# Patient Record
Sex: FEMALE | Race: WHITE | NOT HISPANIC OR LATINO | Employment: FULL TIME | URBAN - METROPOLITAN AREA
[De-identification: names, ages, dates, MRNs, and addresses within clinical notes are randomized per-mention and may not be internally consistent; named-entity substitution may affect disease eponyms.]

---

## 2023-03-04 PROBLEM — E55.9 VITAMIN D DEFICIENCY: Status: ACTIVE | Noted: 2023-03-04

## 2023-03-04 PROBLEM — R05.9 COUGH: Status: ACTIVE | Noted: 2023-03-04

## 2023-03-04 PROBLEM — F41.9 ANXIETY AND DEPRESSION: Status: ACTIVE | Noted: 2023-03-04

## 2023-03-04 PROBLEM — N94.6 MENSTRUAL CRAMP: Status: ACTIVE | Noted: 2023-03-04

## 2023-03-04 PROBLEM — J31.0 RHINITIS: Status: ACTIVE | Noted: 2023-03-04

## 2023-03-04 PROBLEM — S63.619A FINGER SPRAIN: Status: ACTIVE | Noted: 2023-03-04

## 2023-03-04 PROBLEM — J02.9 PHARYNGITIS: Status: ACTIVE | Noted: 2023-03-04

## 2023-03-04 PROBLEM — J30.9 ALLERGIC RHINITIS: Status: ACTIVE | Noted: 2023-03-04
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
## 2023-03-04 PROBLEM — D50.9 IRON DEFICIENCY ANEMIA: Status: ACTIVE | Noted: 2023-03-04

## 2023-03-04 PROBLEM — B00.1 RECURRENT COLD SORES: Status: ACTIVE | Noted: 2023-03-04

## 2023-03-04 PROBLEM — R22.42 LOCALIZED SWELLING OF LEFT LOWER LEG: Status: ACTIVE | Noted: 2023-03-04

## 2023-03-04 PROBLEM — M79.646 FINGER PAIN: Status: ACTIVE | Noted: 2023-03-04

## 2023-03-04 PROBLEM — R53.83 FATIGUE: Status: ACTIVE | Noted: 2023-03-04

## 2023-03-04 PROBLEM — E66.9 OBESITY: Status: ACTIVE | Noted: 2023-03-04

## 2023-03-04 PROBLEM — F32.A ANXIETY AND DEPRESSION: Status: ACTIVE | Noted: 2023-03-04

## 2023-03-04 PROBLEM — S71.159A DOG BITE OF THIGH: Status: ACTIVE | Noted: 2023-03-04

## 2023-03-04 PROBLEM — J02.9 SORE THROAT: Status: ACTIVE | Noted: 2023-03-04

## 2023-03-04 RX ORDER — SERTRALINE HYDROCHLORIDE 50 MG/1
1 TABLET, FILM COATED ORAL DAILY
COMMUNITY
Start: 2019-09-04 | End: 2023-03-09 | Stop reason: SDUPTHER

## 2023-03-04 RX ORDER — ERGOCALCIFEROL 1.25 MG/1
1.25 CAPSULE ORAL 2 TIMES WEEKLY
COMMUNITY
Start: 2021-12-10 | End: 2023-03-09 | Stop reason: ALTCHOICE

## 2023-03-04 RX ORDER — PHENTERMINE HYDROCHLORIDE 37.5 MG/1
1 CAPSULE ORAL
COMMUNITY
Start: 2020-05-14 | End: 2023-03-09 | Stop reason: ALTCHOICE

## 2023-03-09 ENCOUNTER — OFFICE VISIT (OUTPATIENT)
Dept: PRIMARY CARE | Facility: CLINIC | Age: 38
End: 2023-03-09
Payer: COMMERCIAL

## 2023-03-09 VITALS
DIASTOLIC BLOOD PRESSURE: 78 MMHG | TEMPERATURE: 98.2 F | SYSTOLIC BLOOD PRESSURE: 126 MMHG | HEART RATE: 84 BPM | RESPIRATION RATE: 16 BRPM

## 2023-03-09 DIAGNOSIS — M79.605 LEFT LEG PAIN: ICD-10-CM

## 2023-03-09 DIAGNOSIS — R22.42 LOCALIZED SWELLING OF LEFT LOWER LEG: Primary | ICD-10-CM

## 2023-03-09 DIAGNOSIS — R93.6 ABNORMAL X-RAY OF EXTREMITY: ICD-10-CM

## 2023-03-09 DIAGNOSIS — Z13.220 LIPID SCREENING: ICD-10-CM

## 2023-03-09 DIAGNOSIS — R53.83 OTHER FATIGUE: ICD-10-CM

## 2023-03-09 DIAGNOSIS — F41.9 ANXIETY AND DEPRESSION: ICD-10-CM

## 2023-03-09 DIAGNOSIS — F32.A ANXIETY AND DEPRESSION: ICD-10-CM

## 2023-03-09 PROCEDURE — 1036F TOBACCO NON-USER: CPT | Performed by: NURSE PRACTITIONER

## 2023-03-09 PROCEDURE — 99214 OFFICE O/P EST MOD 30 MIN: CPT | Performed by: NURSE PRACTITIONER

## 2023-03-09 RX ORDER — ONDANSETRON 4 MG/1
TABLET, ORALLY DISINTEGRATING ORAL
COMMUNITY
Start: 2022-03-10 | End: 2024-03-19 | Stop reason: WASHOUT

## 2023-03-09 RX ORDER — SERTRALINE HYDROCHLORIDE 50 MG/1
50 TABLET, FILM COATED ORAL DAILY
Qty: 30 TABLET | Refills: 0 | Status: SHIPPED | OUTPATIENT
Start: 2023-03-09 | End: 2023-04-21 | Stop reason: ALTCHOICE

## 2023-03-09 ASSESSMENT — ENCOUNTER SYMPTOMS: LEG PAIN: 1

## 2023-03-09 NOTE — PROGRESS NOTES
Subjective   Patient ID: Amber Carranza is a 37 y.o. female who presents for Leg Pain and Premenstrual Syndrome.    Pt here for leg pain and pmdd    Leg Pain   The incident occurred more than 1 week ago. There was no injury mechanism. The pain is present in the left leg. The quality of the pain is described as aching. The pain is mild. The pain has been Constant since onset. The symptoms are aggravated by movement and palpation. She has tried nothing for the symptoms. The treatment provided mild relief.        Review of Systems   Constitutional:  Negative for chills, fatigue and fever.   HENT:  Negative for congestion, ear pain, rhinorrhea, sinus pressure and sore throat.    Eyes:  Negative for pain, discharge and itching.   Respiratory:  Negative for cough, shortness of breath and wheezing.    Cardiovascular:  Negative for chest pain and palpitations.   Gastrointestinal:  Negative for constipation, diarrhea, nausea and vomiting.   Genitourinary:  Negative for difficulty urinating and dysuria.   Musculoskeletal:  Positive for arthralgias and joint swelling. Negative for back pain and myalgias.   Skin:  Negative for color change.   Neurological:  Negative for headaches.   Hematological:  Negative for adenopathy.   Psychiatric/Behavioral:  Negative for decreased concentration. The patient is nervous/anxious.        Objective   /78   Pulse 84   Temp 36.8 °C (98.2 °F)   Resp 16     Physical Exam  Constitutional:       General: She is not in acute distress.     Appearance: She is not ill-appearing.   HENT:      Head: Normocephalic and atraumatic.      Mouth/Throat:      Mouth: Mucous membranes are moist.      Pharynx: Oropharynx is clear.   Eyes:      Conjunctiva/sclera: Conjunctivae normal.      Pupils: Pupils are equal, round, and reactive to light.   Cardiovascular:      Rate and Rhythm: Normal rate and regular rhythm.      Pulses: Normal pulses.      Heart sounds: Normal heart sounds.   Pulmonary:       Effort: Pulmonary effort is normal. No respiratory distress.      Breath sounds: Normal breath sounds.   Abdominal:      General: Bowel sounds are normal.      Palpations: Abdomen is soft.      Tenderness: There is no abdominal tenderness.   Musculoskeletal:         General: Swelling present. No tenderness. Normal range of motion.      Cervical back: Normal range of motion and neck supple.      Right lower leg: Edema present.      Left lower leg: Edema present.   Skin:     General: Skin is warm and dry.   Neurological:      General: No focal deficit present.      Mental Status: She is alert and oriented to person, place, and time.   Psychiatric:         Mood and Affect: Mood normal.         Behavior: Behavior normal.         Thought Content: Thought content normal.         Judgment: Judgment normal.         Assessment/Plan        Patient to have labs and xray done, and we will call with results when available. Continue meds as ordered, and referred to ortho. Follow-up in 3 months for physical, or sooner if needed. Call the office if any problems or concerns in the meantime.    More than 50% of the visit was spent counseling the patient. A total of more than 30 minutes was spent.

## 2023-03-11 ENCOUNTER — LAB (OUTPATIENT)
Dept: LAB | Facility: LAB | Age: 38
End: 2023-03-11
Payer: COMMERCIAL

## 2023-03-11 DIAGNOSIS — F32.A ANXIETY AND DEPRESSION: ICD-10-CM

## 2023-03-11 DIAGNOSIS — F41.9 ANXIETY AND DEPRESSION: ICD-10-CM

## 2023-03-11 DIAGNOSIS — R53.83 OTHER FATIGUE: ICD-10-CM

## 2023-03-11 DIAGNOSIS — Z13.220 LIPID SCREENING: ICD-10-CM

## 2023-03-11 LAB
ALANINE AMINOTRANSFERASE (SGPT) (U/L) IN SER/PLAS: 24 U/L (ref 7–45)
ALBUMIN (G/DL) IN SER/PLAS: 4.3 G/DL (ref 3.4–5)
ALKALINE PHOSPHATASE (U/L) IN SER/PLAS: 75 U/L (ref 33–110)
ANION GAP IN SER/PLAS: 11 MMOL/L (ref 10–20)
ASPARTATE AMINOTRANSFERASE (SGOT) (U/L) IN SER/PLAS: 22 U/L (ref 9–39)
BASOPHILS (10*3/UL) IN BLOOD BY AUTOMATED COUNT: 0.02 X10E9/L (ref 0–0.1)
BASOPHILS/100 LEUKOCYTES IN BLOOD BY AUTOMATED COUNT: 0.2 % (ref 0–2)
BILIRUBIN TOTAL (MG/DL) IN SER/PLAS: 0.5 MG/DL (ref 0–1.2)
CALCIDIOL (25 OH VITAMIN D3) (NG/ML) IN SER/PLAS: 17 NG/ML
CALCIUM (MG/DL) IN SER/PLAS: 9.1 MG/DL (ref 8.6–10.3)
CARBON DIOXIDE, TOTAL (MMOL/L) IN SER/PLAS: 27 MMOL/L (ref 21–32)
CHLORIDE (MMOL/L) IN SER/PLAS: 102 MMOL/L (ref 98–107)
CHOLESTEROL (MG/DL) IN SER/PLAS: 194 MG/DL (ref 0–199)
CHOLESTEROL IN HDL (MG/DL) IN SER/PLAS: 37 MG/DL
CHOLESTEROL/HDL RATIO: 5.2
CREATININE (MG/DL) IN SER/PLAS: 0.68 MG/DL (ref 0.5–1.05)
EOSINOPHILS (10*3/UL) IN BLOOD BY AUTOMATED COUNT: 0.14 X10E9/L (ref 0–0.7)
EOSINOPHILS/100 LEUKOCYTES IN BLOOD BY AUTOMATED COUNT: 1.7 % (ref 0–6)
ERYTHROCYTE DISTRIBUTION WIDTH (RATIO) BY AUTOMATED COUNT: 13.5 % (ref 11.5–14.5)
ERYTHROCYTE MEAN CORPUSCULAR HEMOGLOBIN CONCENTRATION (G/DL) BY AUTOMATED: 31.5 G/DL (ref 32–36)
ERYTHROCYTE MEAN CORPUSCULAR VOLUME (FL) BY AUTOMATED COUNT: 85 FL (ref 80–100)
ERYTHROCYTES (10*6/UL) IN BLOOD BY AUTOMATED COUNT: 4.65 X10E12/L (ref 4–5.2)
ESTIMATED AVERAGE GLUCOSE FOR HBA1C: 103 MG/DL
GFR FEMALE: >90 ML/MIN/1.73M2
GLUCOSE (MG/DL) IN SER/PLAS: 93 MG/DL (ref 74–99)
HEMATOCRIT (%) IN BLOOD BY AUTOMATED COUNT: 39.4 % (ref 36–46)
HEMOGLOBIN (G/DL) IN BLOOD: 12.4 G/DL (ref 12–16)
HEMOGLOBIN A1C/HEMOGLOBIN TOTAL IN BLOOD: 5.2 %
IMMATURE GRANULOCYTES/100 LEUKOCYTES IN BLOOD BY AUTOMATED COUNT: 0.4 % (ref 0–0.9)
LDL: 122 MG/DL (ref 0–99)
LEUKOCYTES (10*3/UL) IN BLOOD BY AUTOMATED COUNT: 8.1 X10E9/L (ref 4.4–11.3)
LYMPHOCYTES (10*3/UL) IN BLOOD BY AUTOMATED COUNT: 1.87 X10E9/L (ref 1.2–4.8)
LYMPHOCYTES/100 LEUKOCYTES IN BLOOD BY AUTOMATED COUNT: 23.1 % (ref 13–44)
MONOCYTES (10*3/UL) IN BLOOD BY AUTOMATED COUNT: 0.58 X10E9/L (ref 0.1–1)
MONOCYTES/100 LEUKOCYTES IN BLOOD BY AUTOMATED COUNT: 7.2 % (ref 2–10)
NEUTROPHILS (10*3/UL) IN BLOOD BY AUTOMATED COUNT: 5.45 X10E9/L (ref 1.2–7.7)
NEUTROPHILS/100 LEUKOCYTES IN BLOOD BY AUTOMATED COUNT: 67.4 % (ref 40–80)
PLATELETS (10*3/UL) IN BLOOD AUTOMATED COUNT: 266 X10E9/L (ref 150–450)
POTASSIUM (MMOL/L) IN SER/PLAS: 4.3 MMOL/L (ref 3.5–5.3)
PROTEIN TOTAL: 7 G/DL (ref 6.4–8.2)
SODIUM (MMOL/L) IN SER/PLAS: 136 MMOL/L (ref 136–145)
THYROTROPIN (MIU/L) IN SER/PLAS BY DETECTION LIMIT <= 0.05 MIU/L: 1.74 MIU/L (ref 0.44–3.98)
TRIGLYCERIDE (MG/DL) IN SER/PLAS: 174 MG/DL (ref 0–149)
UREA NITROGEN (MG/DL) IN SER/PLAS: 13 MG/DL (ref 6–23)
VLDL: 35 MG/DL (ref 0–40)

## 2023-03-11 PROCEDURE — 84443 ASSAY THYROID STIM HORMONE: CPT

## 2023-03-11 PROCEDURE — 83036 HEMOGLOBIN GLYCOSYLATED A1C: CPT

## 2023-03-11 PROCEDURE — 85025 COMPLETE CBC W/AUTO DIFF WBC: CPT

## 2023-03-11 PROCEDURE — 80061 LIPID PANEL: CPT

## 2023-03-11 PROCEDURE — 82306 VITAMIN D 25 HYDROXY: CPT

## 2023-03-11 PROCEDURE — 80053 COMPREHEN METABOLIC PANEL: CPT

## 2023-03-11 PROCEDURE — 36415 COLL VENOUS BLD VENIPUNCTURE: CPT

## 2023-03-13 DIAGNOSIS — E55.9 VITAMIN D DEFICIENCY: ICD-10-CM

## 2023-03-13 DIAGNOSIS — K52.9 ACUTE GASTROENTERITIS: Primary | ICD-10-CM

## 2023-03-13 RX ORDER — ERGOCALCIFEROL 1.25 MG/1
50000 CAPSULE ORAL
Qty: 12 CAPSULE | Refills: 0 | Status: SHIPPED | OUTPATIENT
Start: 2023-03-13 | End: 2024-03-19 | Stop reason: WASHOUT

## 2023-03-16 ENCOUNTER — DOCUMENTATION (OUTPATIENT)
Dept: PRIMARY CARE | Facility: CLINIC | Age: 38
End: 2023-03-16
Payer: COMMERCIAL

## 2023-03-16 ENCOUNTER — TELEPHONE (OUTPATIENT)
Dept: PRIMARY CARE | Facility: CLINIC | Age: 38
End: 2023-03-16
Payer: COMMERCIAL

## 2023-03-16 ASSESSMENT — ENCOUNTER SYMPTOMS
RHINORRHEA: 0
WHEEZING: 0
SINUS PRESSURE: 0
PALPITATIONS: 0
NERVOUS/ANXIOUS: 1
DIFFICULTY URINATING: 0
EYE PAIN: 0
COLOR CHANGE: 0
DECREASED CONCENTRATION: 0
DYSURIA: 0
FATIGUE: 0
DIARRHEA: 0
COUGH: 0
EYE ITCHING: 0
ARTHRALGIAS: 1
ADENOPATHY: 0
NAUSEA: 0
HEADACHES: 0
CHILLS: 0
VOMITING: 0
BACK PAIN: 0
MYALGIAS: 0
EYE DISCHARGE: 0
SHORTNESS OF BREATH: 0
CONSTIPATION: 0
FEVER: 0
SORE THROAT: 0
JOINT SWELLING: 1

## 2023-04-20 ENCOUNTER — TELEPHONE (OUTPATIENT)
Dept: PRIMARY CARE | Facility: CLINIC | Age: 38
End: 2023-04-20
Payer: COMMERCIAL

## 2023-04-21 DIAGNOSIS — F32.A ANXIETY AND DEPRESSION: Primary | ICD-10-CM

## 2023-04-21 DIAGNOSIS — F41.9 ANXIETY AND DEPRESSION: Primary | ICD-10-CM

## 2023-04-21 RX ORDER — VILAZODONE HYDROCHLORIDE 10 MG/1
TABLET ORAL
Qty: 60 TABLET | Refills: 0 | Status: SHIPPED | OUTPATIENT
Start: 2023-04-21 | End: 2024-03-19 | Stop reason: WASHOUT

## 2023-11-02 DIAGNOSIS — E55.9 VITAMIN D DEFICIENCY: Primary | ICD-10-CM

## 2023-11-02 DIAGNOSIS — D50.9 IRON DEFICIENCY ANEMIA, UNSPECIFIED IRON DEFICIENCY ANEMIA TYPE: ICD-10-CM

## 2023-11-02 DIAGNOSIS — E78.2 MIXED HYPERLIPIDEMIA: ICD-10-CM

## 2024-03-19 ENCOUNTER — OFFICE VISIT (OUTPATIENT)
Dept: PRIMARY CARE | Facility: CLINIC | Age: 39
End: 2024-03-19
Payer: COMMERCIAL

## 2024-03-19 VITALS
OXYGEN SATURATION: 95 % | SYSTOLIC BLOOD PRESSURE: 122 MMHG | WEIGHT: 293 LBS | DIASTOLIC BLOOD PRESSURE: 84 MMHG | BODY MASS INDEX: 52.75 KG/M2 | RESPIRATION RATE: 18 BRPM | HEART RATE: 90 BPM | TEMPERATURE: 98.4 F

## 2024-03-19 DIAGNOSIS — J02.9 SORE THROAT: ICD-10-CM

## 2024-03-19 DIAGNOSIS — J02.9 PHARYNGITIS, UNSPECIFIED ETIOLOGY: Primary | ICD-10-CM

## 2024-03-19 LAB — POC RAPID STREP: NEGATIVE

## 2024-03-19 PROCEDURE — 87880 STREP A ASSAY W/OPTIC: CPT | Performed by: NURSE PRACTITIONER

## 2024-03-19 PROCEDURE — 1036F TOBACCO NON-USER: CPT | Performed by: NURSE PRACTITIONER

## 2024-03-19 PROCEDURE — 99213 OFFICE O/P EST LOW 20 MIN: CPT | Performed by: NURSE PRACTITIONER

## 2024-03-19 PROCEDURE — 87651 STREP A DNA AMP PROBE: CPT

## 2024-03-19 RX ORDER — AMOXICILLIN AND CLAVULANATE POTASSIUM 875; 125 MG/1; MG/1
875 TABLET, FILM COATED ORAL 2 TIMES DAILY
Qty: 20 TABLET | Refills: 0 | Status: SHIPPED | OUTPATIENT
Start: 2024-03-19 | End: 2024-03-29

## 2024-03-19 ASSESSMENT — ENCOUNTER SYMPTOMS
DIARRHEA: 1
SHORTNESS OF BREATH: 0
HEADACHES: 1
SORE THROAT: 1
ABDOMINAL PAIN: 0
CHILLS: 0
FATIGUE: 0
NAUSEA: 0
WHEEZING: 0
RHINORRHEA: 1
COUGH: 1
APPETITE CHANGE: 0
VOMITING: 0
SINUS PRESSURE: 0
SINUS PAIN: 0
MYALGIAS: 0

## 2024-03-19 NOTE — PROGRESS NOTES
Subjective   Patient ID: Amber Carranza is a 38 y.o. female who presents for Sore Throat.    Patient's symptoms started on Thursday with a sore throat. It has worsened over the past two nights. Patient has a runny nose, stuffy nose. Patient is vaccinated against COVID with a booster (not the most recent vaccine). Patient has not tried anything for her symptoms.     Review of Systems   Constitutional:  Negative for appetite change, chills and fatigue.   HENT:  Positive for congestion, rhinorrhea and sore throat. Negative for sinus pressure and sinus pain.    Respiratory:  Positive for cough. Negative for shortness of breath and wheezing.    Cardiovascular:  Negative for chest pain.   Gastrointestinal:  Positive for diarrhea. Negative for abdominal pain, nausea and vomiting.   Musculoskeletal:  Negative for myalgias.   Neurological:  Positive for headaches.     Objective   /84   Pulse 90   Temp 36.9 °C (98.4 °F) (Temporal)   Resp 18   Wt 144 kg (317 lb)   SpO2 95%   BMI 52.75 kg/m²     Physical Exam  Vitals reviewed.   Constitutional:       General: She is not in acute distress.     Appearance: Normal appearance. She is not toxic-appearing.   HENT:      Head: Atraumatic.      Right Ear: Tympanic membrane, ear canal and external ear normal.      Left Ear: Tympanic membrane, ear canal and external ear normal.      Nose: Congestion present. No rhinorrhea.      Mouth/Throat:      Pharynx: Posterior oropharyngeal erythema present. No oropharyngeal exudate.      Comments: Tonsils enlarged +2, uvula midline, moderate post nasal drainage  Eyes:      Conjunctiva/sclera: Conjunctivae normal.   Cardiovascular:      Rate and Rhythm: Normal rate and regular rhythm.      Heart sounds: Normal heart sounds. No murmur heard.  Pulmonary:      Effort: Pulmonary effort is normal.      Breath sounds: Normal breath sounds. No wheezing or rhonchi.   Skin:     General: Skin is warm and dry.   Neurological:      General: No  focal deficit present.      Mental Status: She is alert.   Psychiatric:         Mood and Affect: Mood normal.     Assessment/Plan   Problem List Items Addressed This Visit             ICD-10-CM    Pharyngitis - Primary J02.9    Relevant Medications    amoxicillin-pot clavulanate (Augmentin) 875-125 mg tablet    Sore throat J02.9    Relevant Orders    POCT rapid strep A manually resulted (Completed)    Group A Streptococcus, PCR     Rapid strep is negative. Will get back up PCR strep testing. Patient will do a COVID test to rule out COVID. Pt started on augmentin at this time. She will use flonase as well. Advised patient on use of humidifier and hot steam treatments. Discussed that patient is to drink plenty of fluids and stay well hydrated. Can take tylenol as needed for any fevers or discomfort. Discussed that patient is to go to the ER for any chest pain, difficulty breathing, shortness of breath or new/concerning symptoms; she agreed. Will call pt when results become available. Advised follow up in 2-3 days if symptoms persist despite the use of the medications.

## 2024-03-20 ENCOUNTER — TELEPHONE (OUTPATIENT)
Dept: PRIMARY CARE | Facility: CLINIC | Age: 39
End: 2024-03-20
Payer: COMMERCIAL

## 2024-03-20 LAB — S PYO DNA THROAT QL NAA+PROBE: NOT DETECTED

## 2024-03-20 NOTE — TELEPHONE ENCOUNTER
----- Message from JJ Bello-CNP sent at 3/20/2024 10:25 AM EDT -----  Strep PCR testing is negative.

## 2024-07-22 ENCOUNTER — HOSPITAL ENCOUNTER (OUTPATIENT)
Dept: RADIOLOGY | Facility: CLINIC | Age: 39
Discharge: HOME | End: 2024-07-22
Payer: COMMERCIAL

## 2024-07-22 ENCOUNTER — OFFICE VISIT (OUTPATIENT)
Dept: ORTHOPEDIC SURGERY | Facility: CLINIC | Age: 39
End: 2024-07-22
Payer: COMMERCIAL

## 2024-07-22 DIAGNOSIS — M25.562 ACUTE PAIN OF LEFT KNEE: ICD-10-CM

## 2024-07-22 DIAGNOSIS — M23.92 INTERNAL DERANGEMENT OF LEFT KNEE: ICD-10-CM

## 2024-07-22 DIAGNOSIS — S83.92XA SPRAIN OF LEFT KNEE, INITIAL ENCOUNTER: Primary | ICD-10-CM

## 2024-07-22 PROCEDURE — 99213 OFFICE O/P EST LOW 20 MIN: CPT | Performed by: INTERNAL MEDICINE

## 2024-07-22 PROCEDURE — 99203 OFFICE O/P NEW LOW 30 MIN: CPT | Performed by: INTERNAL MEDICINE

## 2024-07-22 PROCEDURE — 73564 X-RAY EXAM KNEE 4 OR MORE: CPT | Mod: LT

## 2024-07-22 PROCEDURE — 73564 X-RAY EXAM KNEE 4 OR MORE: CPT | Mod: LEFT SIDE | Performed by: INTERNAL MEDICINE

## 2024-07-22 RX ORDER — METHYLPREDNISOLONE 4 MG/1
TABLET ORAL
Qty: 1 EACH | Refills: 0 | Status: SHIPPED | OUTPATIENT
Start: 2024-07-22

## 2024-07-22 NOTE — PROGRESS NOTES
Acute Injury New Patient Visit    CC:   Chief Complaint   Patient presents with    Left Knee - Pain     Patient states hit knee on side of garage a month ago. Having pain still. Xrays today.        HPI: Amber is a 38 y.o. female presents today for evaluation for subacute left knee injury sustained about a month ago she hit her left knee against the side side of the garage. She notes worsening left knee pain in the past five days. She is here for initial evaluation and x-rays.         Review of Systems   GENERAL: Negative for malaise, significant weight loss, fever  MUSCULOSKELETAL: See HPI  NEURO:  Negative for numbness / tingling     Past Medical History  Past Medical History:   Diagnosis Date    Other specified health status 04/17/2019    No pertinent past medical history       Medication review  Medication Documentation Review Audit       Reviewed by PIOTR Bello (Nurse Practitioner) on 03/19/24 at 1623      Medication Order Taking? Sig Documenting Provider Last Dose Status   Patient not taking:  Discontinued 03/19/24 1622   Discontinued 03/19/24 1622   Patient not taking:  Discontinued 03/19/24 1622                    Allergies  No Known Allergies    Social History  Social History     Socioeconomic History    Marital status:      Spouse name: Not on file    Number of children: Not on file    Years of education: Not on file    Highest education level: Not on file   Occupational History    Not on file   Tobacco Use    Smoking status: Never    Smokeless tobacco: Never   Vaping Use    Vaping status: Never Used   Substance and Sexual Activity    Alcohol use: Never    Drug use: Never    Sexual activity: Not on file   Other Topics Concern    Not on file   Social History Narrative    Not on file     Social Determinants of Health     Financial Resource Strain: Not on file   Food Insecurity: Not on file   Transportation Needs: Not on file   Physical Activity: Not on file   Stress: Not on file    Social Connections: Not on file   Intimate Partner Violence: Not on file   Housing Stability: Not on file       Surgical History  Past Surgical History:   Procedure Laterality Date    MR HEAD ANGIO WO IV CONTRAST  3/10/2022    MR HEAD ANGIO WO IV CONTRAST 3/10/2022 [facility]    MR NECK ANGIO WO IV CONTRAST  3/10/2022    MR NECK ANGIO WO IV CONTRAST 3/10/2022 [facility]    OTHER SURGICAL HISTORY  2019     section       Physical Exam:  GENERAL:  Patient is awake, alert, and oriented to person place and time.  Patient appears well nourished and well kept.  Affect Calm, Not Acutely Distressed.  HEENT:  Normocephalic, Atraumatic, EOMI  CARDIOVASCULAR:  Hemodynamically stable.  RESPIRATORY:  Normal respirations with unlabored breathing.  Extremity: Left knee examination shows skin is intact.  There is no erythema or warmth.  Trace amount of effusion.  Can flex the right knee to 120 degrees with pain.  Full extension at 0 degrees.  Pain over the medial joint line.  Pain over the lateral joint line.  Pain over the lateral femoral condyle.  There is no pain over the patellar or quadricep tendon.  No pain over the proximal tibia.  No pain over the popliteal fossa.  Negative valgus stress test.  Negative varus stress test.  Positive Mauricio's test medially with no instability.  Positive Mauricio's test laterally with no instability.  Negative Lachman's test.  Patellar and quadricep mechanism intact.  Negative anterior and posterior drawer test.  Negative patellar apprehension test.  Distal pulses are palpable, neurovascularly intact.  Walking with no significant antalgic gait.      Diagnostics: X-rays reviewed      Procedure: None    Assessment:   Left knee sprain and contusion  Left knee internal derangement    Plan: Amber presents today for initial evaluation for subacute left knee injury sustained about a month ago. X-rays showed no obvious fractures. We recommended MRI of the left  knee and Medrol dose Hubert for acute inflammation. She will follow-up after the MRI.     Orders Placed This Encounter    XR knee left 4+ views      At the conclusion of the visit there were no further questions by the patient/family regarding their plan of care.  Patient was instructed to call or return with any issues, questions, or concerns regarding their injury and/or treatment plan described above.     07/22/24 at 4:17 PM - Miriam Li MD  Scribe Attestation  By signing my name below, I, Shane Debra, Scribe   attest that this documentation has been prepared under the direction and in the presence of Miriam Li MD.    Office: [phone]    This note was prepared using voice recognition software.  The details of this note are correct and have been reviewed, and corrected to the best of my ability.  Some grammatical errors may persist related to the Dragon software.

## 2024-10-10 ENCOUNTER — OFFICE VISIT (OUTPATIENT)
Dept: PRIMARY CARE | Facility: CLINIC | Age: 39
End: 2024-10-10
Payer: COMMERCIAL

## 2024-10-10 VITALS
WEIGHT: 293 LBS | TEMPERATURE: 98.2 F | BODY MASS INDEX: 47.09 KG/M2 | DIASTOLIC BLOOD PRESSURE: 80 MMHG | HEART RATE: 101 BPM | RESPIRATION RATE: 15 BRPM | HEIGHT: 66 IN | SYSTOLIC BLOOD PRESSURE: 126 MMHG

## 2024-10-10 DIAGNOSIS — E66.813 CLASS 3 SEVERE OBESITY DUE TO EXCESS CALORIES WITH BODY MASS INDEX (BMI) OF 50.0 TO 59.9 IN ADULT, UNSPECIFIED WHETHER SERIOUS COMORBIDITY PRESENT: Primary | ICD-10-CM

## 2024-10-10 DIAGNOSIS — R63.8 UNABLE TO LOSE WEIGHT: ICD-10-CM

## 2024-10-10 DIAGNOSIS — F41.9 ANXIETY AND DEPRESSION: ICD-10-CM

## 2024-10-10 DIAGNOSIS — F32.A ANXIETY AND DEPRESSION: ICD-10-CM

## 2024-10-10 DIAGNOSIS — E55.9 VITAMIN D DEFICIENCY: ICD-10-CM

## 2024-10-10 DIAGNOSIS — E66.01 CLASS 3 SEVERE OBESITY DUE TO EXCESS CALORIES WITH BODY MASS INDEX (BMI) OF 50.0 TO 59.9 IN ADULT, UNSPECIFIED WHETHER SERIOUS COMORBIDITY PRESENT: Primary | ICD-10-CM

## 2024-10-10 DIAGNOSIS — Z13.220 LIPID SCREENING: ICD-10-CM

## 2024-10-10 PROCEDURE — 3008F BODY MASS INDEX DOCD: CPT | Performed by: NURSE PRACTITIONER

## 2024-10-10 PROCEDURE — 99214 OFFICE O/P EST MOD 30 MIN: CPT | Performed by: NURSE PRACTITIONER

## 2024-10-10 RX ORDER — INFLUENZA A VIRUS A/GEORGIA/12/2022 CVR-167 (H1N1) ANTIGEN (MDCK CELL DERIVED, PROPIOLACTONE INACTIVATED), INFLUENZA A VIRUS A/DARWIN/11/2021 (H3N2) ANTIGEN (MDCK CELL DERIVED, PROPIOLACTONE INACTIVATED),INFLUENZA B VIRUS B/SINGAPORE/WUH4618/2021 ANTIGEN (MDCK CELL DERIVED, PROPIOLACTONE INACTIVATED),INFLUENZA B VIRUS B/SINGAPORE/INFTT-16-0610/2016 ANTIGEN (MDCK CELL DERIVED, PROPIOLACTONE INACTIVATED) 15; 15; 15; 15 UG/.5ML; UG/.5ML; UG/.5ML; UG/.5ML
0.5 INJECTION, SUSPENSION INTRAMUSCULAR ONCE
COMMUNITY
Start: 2023-10-24

## 2024-10-10 RX ORDER — VILAZODONE HYDROCHLORIDE 10 MG/1
10 TABLET ORAL DAILY
Qty: 30 TABLET | Refills: 1 | Status: SHIPPED | OUTPATIENT
Start: 2024-10-10 | End: 2024-12-09

## 2024-10-10 RX ORDER — PHENTERMINE HYDROCHLORIDE 37.5 MG/1
37.5 TABLET ORAL
Qty: 30 TABLET | Refills: 0 | Status: SHIPPED | OUTPATIENT
Start: 2024-10-10 | End: 2024-11-09

## 2024-10-10 NOTE — PROGRESS NOTES
"Subjective   Patient ID: Amber Carranza is a 38 y.o. female who presents for No chief complaint on file..    Depression, feels like she's going to kill her . Would like to lose weight.    Subjective  Amber Carranza is a 38 y.o. female here for discussion regarding weight loss. She has noted a weight gain of approximately 50 pounds over the last 5 years. She feels ideal weight is 215 pounds. History of eating disorders: none. There is a family history positive for obesity in the patient. Previous treatments for obesity include commercial weight loss program, self-directed dieting, very low calorie diet, and Weight Watchers. Obesity associated medical conditions: depression. Obesity associated medications: none. Cardiovascular risk factors besides obesity: obesity (BMI >= 30 kg/m2).            Review of Systems   Constitutional:  Positive for fatigue and unexpected weight change. Negative for chills and fever.   HENT:  Negative for congestion, ear pain, rhinorrhea, sinus pressure and sore throat.    Eyes:  Negative for pain, discharge and itching.   Respiratory:  Negative for cough, shortness of breath and wheezing.    Cardiovascular:  Negative for chest pain and palpitations.   Gastrointestinal:  Negative for constipation, diarrhea, nausea and vomiting.   Genitourinary:  Negative for difficulty urinating and dysuria.   Musculoskeletal:  Negative for back pain, joint swelling and myalgias.   Skin:  Negative for color change.   Neurological:  Negative for headaches.   Hematological:  Negative for adenopathy.   Psychiatric/Behavioral:  Negative for decreased concentration. The patient is nervous/anxious.         Depression       Objective     /80   Pulse 101   Temp 36.8 °C (98.2 °F) (Temporal)   Resp 15   Ht 1.676 m (5' 6\")   Wt 142 kg (313 lb)   BMI 50.52 kg/m²      Physical Exam  Constitutional:       Appearance: Normal appearance. She is obese.   Cardiovascular:      Rate and Rhythm: Normal " rate and regular rhythm.      Pulses: Normal pulses.      Heart sounds: Normal heart sounds.   Pulmonary:      Effort: Pulmonary effort is normal.      Breath sounds: Normal breath sounds.   Abdominal:      General: Bowel sounds are normal.      Palpations: Abdomen is soft.   Musculoskeletal:         General: Normal range of motion.   Skin:     General: Skin is warm and dry.   Neurological:      Mental Status: She is alert.   Psychiatric:         Attention and Perception: Attention normal.         Mood and Affect: Mood is depressed. Mood is not anxious. Affect is labile and flat. Affect is not tearful.         Speech: Speech normal.         Behavior: Behavior normal.         Thought Content: Thought content normal.         Cognition and Memory: Cognition normal.          Assessment/Plan  Obesity. I assessed Amber to be in an action stage with respect to weight loss.  General weight loss/lifestyle modification strategies discussed (elicit support from others; identify saboteurs; non-food rewards, etc).  Diet interventions: qualitative changes (increase low-fat, high-fiber foods).  Informal exercise measures discussed, e.g. taking stairs instead of elevator.  Pharmacotherapy as ordered.  Follow up in: 1 month and as needed.     More than 50% of the visit was spent counseling the patient. A total of more than 30 minutes was spent.

## 2024-10-11 ASSESSMENT — ENCOUNTER SYMPTOMS
HEADACHES: 0
DECREASED CONCENTRATION: 0
EYE PAIN: 0
ADENOPATHY: 0
NAUSEA: 0
EYE DISCHARGE: 0
SHORTNESS OF BREATH: 0
COLOR CHANGE: 0
PALPITATIONS: 0
BACK PAIN: 0
FEVER: 0
JOINT SWELLING: 0
CHILLS: 0
CONSTIPATION: 0
UNEXPECTED WEIGHT CHANGE: 1
COUGH: 0
DYSURIA: 0
DIARRHEA: 0
NERVOUS/ANXIOUS: 1
EYE ITCHING: 0
FATIGUE: 1
MYALGIAS: 0
DIFFICULTY URINATING: 0
WHEEZING: 0
SORE THROAT: 0
RHINORRHEA: 0
VOMITING: 0
SINUS PRESSURE: 0

## 2024-11-07 ENCOUNTER — TELEPHONE (OUTPATIENT)
Dept: PRIMARY CARE | Facility: CLINIC | Age: 39
End: 2024-11-07

## 2024-11-07 ENCOUNTER — OFFICE VISIT (OUTPATIENT)
Dept: PRIMARY CARE | Facility: CLINIC | Age: 39
End: 2024-11-07
Payer: COMMERCIAL

## 2024-11-07 VITALS
BODY MASS INDEX: 49.07 KG/M2 | RESPIRATION RATE: 17 BRPM | SYSTOLIC BLOOD PRESSURE: 134 MMHG | DIASTOLIC BLOOD PRESSURE: 90 MMHG | HEART RATE: 81 BPM | TEMPERATURE: 97.3 F | WEIGHT: 293 LBS

## 2024-11-07 DIAGNOSIS — F41.9 ANXIETY AND DEPRESSION: ICD-10-CM

## 2024-11-07 DIAGNOSIS — F32.A ANXIETY AND DEPRESSION: ICD-10-CM

## 2024-11-07 DIAGNOSIS — E66.813 CLASS 3 SEVERE OBESITY DUE TO EXCESS CALORIES WITH BODY MASS INDEX (BMI) OF 50.0 TO 59.9 IN ADULT, UNSPECIFIED WHETHER SERIOUS COMORBIDITY PRESENT: ICD-10-CM

## 2024-11-07 DIAGNOSIS — E66.01 CLASS 3 SEVERE OBESITY DUE TO EXCESS CALORIES WITH BODY MASS INDEX (BMI) OF 50.0 TO 59.9 IN ADULT, UNSPECIFIED WHETHER SERIOUS COMORBIDITY PRESENT: ICD-10-CM

## 2024-11-07 DIAGNOSIS — R63.8 UNABLE TO LOSE WEIGHT: ICD-10-CM

## 2024-11-07 PROCEDURE — 99213 OFFICE O/P EST LOW 20 MIN: CPT | Performed by: NURSE PRACTITIONER

## 2024-11-07 PROCEDURE — 1036F TOBACCO NON-USER: CPT | Performed by: NURSE PRACTITIONER

## 2024-11-07 RX ORDER — VILAZODONE HYDROCHLORIDE 10 MG/1
10 TABLET ORAL DAILY
Qty: 30 TABLET | Refills: 1 | Status: SHIPPED | OUTPATIENT
Start: 2024-11-07 | End: 2024-11-07

## 2024-11-07 RX ORDER — VILAZODONE HYDROCHLORIDE 20 MG/1
20 TABLET ORAL DAILY
Qty: 30 TABLET | Refills: 1 | Status: SHIPPED | OUTPATIENT
Start: 2024-11-07 | End: 2025-01-06

## 2024-11-07 RX ORDER — PHENTERMINE HYDROCHLORIDE 37.5 MG/1
37.5 TABLET ORAL
Qty: 30 TABLET | Refills: 0 | Status: SHIPPED | OUTPATIENT
Start: 2024-11-07 | End: 2024-12-07

## 2024-11-07 ASSESSMENT — ENCOUNTER SYMPTOMS
HEADACHES: 0
IRRITABILITY: 0
CHILLS: 0
SORE THROAT: 0
RHINORRHEA: 0
DEPRESSED MOOD: 0
DIARRHEA: 0
MALAISE: 1
DYSURIA: 0
FEVER: 0
SHORTNESS OF BREATH: 0
FATIGUE: 0
CONSTIPATION: 0
NERVOUS/ANXIOUS: 0

## 2024-11-07 NOTE — PATIENT INSTRUCTIONS
Patient to increase viibryd to 15mg x 1 week, then 20mg every day afterwards. Continue adipex daily. Follow-up in 1 month, or sooner if needed. Call the office if any problems or concerns in the meantime.

## 2024-11-07 NOTE — PROGRESS NOTES
Subjective   Patient ID: Amber Carranza is a 38 y.o. female who presents for weight loss and depression/anxiety.    She is down 9 lbs with the adipex, no problems or side effects.    Anxiety  Presents for follow-up visit. Symptoms include malaise. Patient reports no chest pain, depressed mood, excessive worry, irritability, nervous/anxious behavior or shortness of breath. Symptoms occur occasionally. The severity of symptoms is mild. The patient sleeps 8 hours per night. The quality of sleep is good. Nighttime awakenings: one to two.     Compliance with medications is %.       Review of Systems   Constitutional:  Negative for chills, fatigue, fever and irritability.   HENT:  Negative for rhinorrhea and sore throat.    Respiratory:  Negative for shortness of breath.    Cardiovascular:  Negative for chest pain.   Gastrointestinal:  Negative for constipation and diarrhea.   Genitourinary:  Negative for dysuria.   Neurological:  Negative for headaches.   Psychiatric/Behavioral:  The patient is not nervous/anxious.        Objective     /90   Pulse 81   Temp 36.3 °C (97.3 °F) (Temporal)   Resp 17   Wt 138 kg (304 lb)   BMI 49.07 kg/m²      Physical Exam  Constitutional:       Appearance: Normal appearance.   Cardiovascular:      Rate and Rhythm: Normal rate and regular rhythm.      Pulses: Normal pulses.      Heart sounds: Normal heart sounds.   Pulmonary:      Effort: Pulmonary effort is normal.      Breath sounds: Normal breath sounds.   Abdominal:      General: Bowel sounds are normal.      Palpations: Abdomen is soft.   Musculoskeletal:         General: Normal range of motion.   Skin:     General: Skin is warm and dry.   Neurological:      Mental Status: She is alert.   Psychiatric:         Mood and Affect: Mood normal.         Behavior: Behavior normal.         Thought Content: Thought content normal.         Judgment: Judgment normal.         Assessment/Plan   Problem List Items Addressed This  Visit       Anxiety and depression    Relevant Medications    vilazodone (Viibryd) 20 mg tablet    Obesity    Relevant Medications    phentermine (Adipex-P) 37.5 mg tablet     Other Visit Diagnoses       Unable to lose weight        Relevant Medications    phentermine (Adipex-P) 37.5 mg tablet            Patient Instructions   Patient to increase viibryd to 15mg x 1 week, then 20mg every day afterwards. Continue adipex daily. Follow-up in 1 month, or sooner if needed. Call the office if any problems or concerns in the meantime.

## 2024-11-19 DIAGNOSIS — T75.3XXA MOTION SICKNESS, INITIAL ENCOUNTER: Primary | ICD-10-CM

## 2024-11-19 RX ORDER — SCOLOPAMINE TRANSDERMAL SYSTEM 1 MG/1
1 PATCH, EXTENDED RELEASE TRANSDERMAL
Qty: 4 PATCH | Refills: 0 | Status: SHIPPED | OUTPATIENT
Start: 2024-11-19 | End: 2025-01-18

## 2025-04-03 ENCOUNTER — OFFICE VISIT (OUTPATIENT)
Dept: PRIMARY CARE | Facility: CLINIC | Age: 40
End: 2025-04-03
Payer: COMMERCIAL

## 2025-04-03 VITALS
BODY MASS INDEX: 51.49 KG/M2 | TEMPERATURE: 98.5 F | DIASTOLIC BLOOD PRESSURE: 94 MMHG | WEIGHT: 293 LBS | HEART RATE: 93 BPM | SYSTOLIC BLOOD PRESSURE: 138 MMHG

## 2025-04-03 DIAGNOSIS — E66.813 CLASS 3 SEVERE OBESITY DUE TO EXCESS CALORIES WITH BODY MASS INDEX (BMI) OF 50.0 TO 59.9 IN ADULT, UNSPECIFIED WHETHER SERIOUS COMORBIDITY PRESENT: Primary | ICD-10-CM

## 2025-04-03 DIAGNOSIS — Z83.3 FAMILY HISTORY OF DIABETES MELLITUS: ICD-10-CM

## 2025-04-03 DIAGNOSIS — D50.9 IRON DEFICIENCY ANEMIA, UNSPECIFIED IRON DEFICIENCY ANEMIA TYPE: ICD-10-CM

## 2025-04-03 DIAGNOSIS — F32.A ANXIETY AND DEPRESSION: ICD-10-CM

## 2025-04-03 DIAGNOSIS — Z13.220 LIPID SCREENING: ICD-10-CM

## 2025-04-03 DIAGNOSIS — E55.9 VITAMIN D DEFICIENCY: ICD-10-CM

## 2025-04-03 DIAGNOSIS — F41.9 ANXIETY AND DEPRESSION: ICD-10-CM

## 2025-04-03 DIAGNOSIS — R53.83 OTHER FATIGUE: ICD-10-CM

## 2025-04-03 DIAGNOSIS — E66.01 CLASS 3 SEVERE OBESITY DUE TO EXCESS CALORIES WITH BODY MASS INDEX (BMI) OF 50.0 TO 59.9 IN ADULT, UNSPECIFIED WHETHER SERIOUS COMORBIDITY PRESENT: Primary | ICD-10-CM

## 2025-04-03 DIAGNOSIS — R63.8 UNABLE TO LOSE WEIGHT: ICD-10-CM

## 2025-04-03 PROCEDURE — 99214 OFFICE O/P EST MOD 30 MIN: CPT | Performed by: NURSE PRACTITIONER

## 2025-04-03 RX ORDER — PHENTERMINE HYDROCHLORIDE 37.5 MG/1
37.5 TABLET ORAL
Qty: 30 TABLET | Refills: 0 | Status: SHIPPED | OUTPATIENT
Start: 2025-04-03 | End: 2025-05-03

## 2025-04-03 NOTE — PROGRESS NOTES
Subjective   Patient ID: Amber Carranza is a 39 y.o. female who presents for No chief complaint on file..  Knee Pain   The pain is present in the left knee. The quality of the pain is described as aching. The patient is experiencing no pain. The symptoms are aggravated by movement and weight bearing. She has tried nothing for the symptoms.       Review of Systems    Objective     BP (!) 138/94 (BP Location: Left arm, Patient Position: Sitting, BP Cuff Size: Large adult)   Pulse 93   Temp 36.9 °C (98.5 °F) (Tympanic)   Wt 145 kg (319 lb)   BMI 51.49 kg/m²      Physical Exam    Assessment/Plan   Problem List Items Addressed This Visit    None      There are no Patient Instructions on file for this visit.        Orders    TSH with reflex to Free T4 if abnormal    Fatigue    Relevant Orders    Comprehensive Metabolic Panel    Iron deficiency anemia    Relevant Orders    Folate    Reticulocytes    Ferritin    Iron and TIBC    CBC and Auto Differential    Vitamin B12    Obesity - Primary    Relevant Medications    phentermine (Adipex-P) 37.5 mg tablet    Other Relevant Orders    Referral to Clinical Pharmacy    Vitamin D deficiency    Relevant Orders    Vitamin D 25-Hydroxy,Total (for eval of Vitamin D levels)     Other Visit Diagnoses       Lipid screening        Relevant Orders    Lipid Panel    Unable to lose weight        Relevant Medications    phentermine (Adipex-P) 37.5 mg tablet    Other Relevant Orders    Referral to Clinical Pharmacy    Family history of diabetes mellitus        Relevant Orders    Hemoglobin A1C            Patient Instructions   Discussed tylenol/ibuprofen for knee pain. Start adipex as ordered. Have fasting labs drawn, and we will call with results when available. Referred to pharmacy. Follow-up in 1 month, or sooner if needed. Call the office if any problems or concerns in the meantime.

## 2025-04-09 VITALS
HEART RATE: 93 BPM | BODY MASS INDEX: 51.49 KG/M2 | TEMPERATURE: 98.5 F | SYSTOLIC BLOOD PRESSURE: 128 MMHG | DIASTOLIC BLOOD PRESSURE: 88 MMHG | WEIGHT: 293 LBS

## 2025-04-09 ASSESSMENT — ENCOUNTER SYMPTOMS
FEVER: 0
DYSURIA: 0
FATIGUE: 0
UNEXPECTED WEIGHT CHANGE: 1
CHILLS: 0
CONSTIPATION: 0
MYALGIAS: 1
SHORTNESS OF BREATH: 0
DIARRHEA: 0
NERVOUS/ANXIOUS: 0
HEADACHES: 0
ARTHRALGIAS: 1
SORE THROAT: 0
RHINORRHEA: 0

## 2025-04-09 NOTE — PATIENT INSTRUCTIONS
Discussed tylenol/ibuprofen for knee pain. Start adipex as ordered. Have fasting labs drawn, and we will call with results when available. Referred to pharmacy. Follow-up in 1 month, or sooner if needed. Call the office if any problems or concerns in the meantime.

## 2025-04-13 LAB
25(OH)D3+25(OH)D2 SERPL-MCNC: 21 NG/ML (ref 30–100)
ALBUMIN SERPL-MCNC: 4.4 G/DL (ref 3.6–5.1)
ALP SERPL-CCNC: 79 U/L (ref 31–125)
ALT SERPL-CCNC: 18 U/L (ref 6–29)
ANION GAP SERPL CALCULATED.4IONS-SCNC: 6 MMOL/L (CALC) (ref 7–17)
AST SERPL-CCNC: 20 U/L (ref 10–30)
BASOPHILS # BLD AUTO: 23 CELLS/UL (ref 0–200)
BASOPHILS NFR BLD AUTO: 0.3 %
BILIRUB SERPL-MCNC: 0.5 MG/DL (ref 0.2–1.2)
BUN SERPL-MCNC: 10 MG/DL (ref 7–25)
CALCIUM SERPL-MCNC: 8.8 MG/DL (ref 8.6–10.2)
CHLORIDE SERPL-SCNC: 105 MMOL/L (ref 98–110)
CHOLEST SERPL-MCNC: 203 MG/DL
CHOLEST/HDLC SERPL: 4.7 (CALC)
CO2 SERPL-SCNC: 28 MMOL/L (ref 20–32)
CREAT SERPL-MCNC: 0.61 MG/DL (ref 0.5–0.97)
EGFRCR SERPLBLD CKD-EPI 2021: 117 ML/MIN/1.73M2
EOSINOPHIL # BLD AUTO: 139 CELLS/UL (ref 15–500)
EOSINOPHIL NFR BLD AUTO: 1.8 %
ERYTHROCYTE [DISTWIDTH] IN BLOOD BY AUTOMATED COUNT: 13.2 % (ref 11–15)
EST. AVERAGE GLUCOSE BLD GHB EST-MCNC: 123 MG/DL
EST. AVERAGE GLUCOSE BLD GHB EST-SCNC: 6.8 MMOL/L
FERRITIN SERPL-MCNC: 62 NG/ML (ref 16–154)
FOLATE SERPL-MCNC: 5.1 NG/ML
GLUCOSE SERPL-MCNC: 109 MG/DL (ref 65–99)
HBA1C MFR BLD: 5.9 % OF TOTAL HGB
HCT VFR BLD AUTO: 40.5 % (ref 35–45)
HDLC SERPL-MCNC: 43 MG/DL
HGB BLD-MCNC: 13.4 G/DL (ref 11.7–15.5)
IRON SATN MFR SERPL: 15 % (CALC) (ref 16–45)
IRON SERPL-MCNC: 50 MCG/DL (ref 40–190)
LDLC SERPL CALC-MCNC: 136 MG/DL (CALC)
LYMPHOCYTES # BLD AUTO: 1910 CELLS/UL (ref 850–3900)
LYMPHOCYTES NFR BLD AUTO: 24.8 %
MCH RBC QN AUTO: 27.9 PG (ref 27–33)
MCHC RBC AUTO-ENTMCNC: 33.1 G/DL (ref 32–36)
MCV RBC AUTO: 84.4 FL (ref 80–100)
MONOCYTES # BLD AUTO: 531 CELLS/UL (ref 200–950)
MONOCYTES NFR BLD AUTO: 6.9 %
NEUTROPHILS # BLD AUTO: 5097 CELLS/UL (ref 1500–7800)
NEUTROPHILS NFR BLD AUTO: 66.2 %
NONHDLC SERPL-MCNC: 160 MG/DL (CALC)
PLATELET # BLD AUTO: 247 THOUSAND/UL (ref 140–400)
PMV BLD REES-ECKER: 10.3 FL (ref 7.5–12.5)
POTASSIUM SERPL-SCNC: 4.3 MMOL/L (ref 3.5–5.3)
PROT SERPL-MCNC: 7.1 G/DL (ref 6.1–8.1)
RBC # BLD AUTO: 4.8 MILLION/UL (ref 3.8–5.1)
RETICS #: ABNORMAL CELLS/UL (ref 20000–80000)
RETICS/RBC NFR AUTO: 1.7 %
SODIUM SERPL-SCNC: 139 MMOL/L (ref 135–146)
TIBC SERPL-MCNC: 341 MCG/DL (CALC) (ref 250–450)
TRIGL SERPL-MCNC: 120 MG/DL
TSH SERPL-ACNC: 1.67 MIU/L
VIT B12 SERPL-MCNC: 464 PG/ML (ref 200–1100)
WBC # BLD AUTO: 7.7 THOUSAND/UL (ref 3.8–10.8)

## 2025-04-15 ENCOUNTER — APPOINTMENT (OUTPATIENT)
Dept: PHARMACY | Facility: HOSPITAL | Age: 40
End: 2025-04-15
Payer: COMMERCIAL

## 2025-04-15 NOTE — PROGRESS NOTES
Clinical Pharmacy Appointment    Patient ID: Amber Carranza is a 39 y.o. female who presents for No chief complaint on file..    Pt is here for First appointment.     Referring Provider: Cirilo Desai APRN-*  PCP: JJ Simms-CNP   Last visit with PCP: 4/3/2025   Next visit with PCP: not yet scheduled; recommended    Patient's insurance does not cover weight loss medications. Discussed alternative options with patient, including Zepbound vials, copay cards, Wegovy direct shipment to patient program, and compounded semaglutide. Patient would like to take some time to think over options. Patient also recently had an elevated A1c at 5.9%. Will try to submit a prior authorization for Ozempic to see if medication will be covered. Will follow-up in 1 week.      Clinical Pharmacist follow-up: 4/22/25 @ 10:20 AM, Telehealth visit  Patient is not followed in [facility].     Continue all meds under the continuation of care with the referring provider and clinical pharmacy team.    Thank you,  Leesa Teresa, PharmD  Clinical Pharmacist  [phone]    Verbal consent to manage patient's drug therapy was obtained from the patient. They were informed they may decline to participate or withdraw from participation in pharmacy services at any time.

## 2025-04-17 ENCOUNTER — TELEMEDICINE (OUTPATIENT)
Dept: PHARMACY | Facility: HOSPITAL | Age: 40
End: 2025-04-17
Payer: COMMERCIAL

## 2025-04-17 DIAGNOSIS — E66.01 CLASS 3 SEVERE OBESITY DUE TO EXCESS CALORIES WITH BODY MASS INDEX (BMI) OF 50.0 TO 59.9 IN ADULT, UNSPECIFIED WHETHER SERIOUS COMORBIDITY PRESENT: ICD-10-CM

## 2025-04-17 DIAGNOSIS — E66.813 CLASS 3 SEVERE OBESITY DUE TO EXCESS CALORIES WITH BODY MASS INDEX (BMI) OF 50.0 TO 59.9 IN ADULT, UNSPECIFIED WHETHER SERIOUS COMORBIDITY PRESENT: ICD-10-CM

## 2025-04-17 DIAGNOSIS — R63.8 UNABLE TO LOSE WEIGHT: ICD-10-CM

## 2025-04-18 NOTE — PROGRESS NOTES
Clinical Pharmacy Appointment    Patient ID: Amber Carranza is a 39 y.o. female who presents for No chief complaint on file..    Pt is here for Follow Up appointment.     Referring Provider: Cirilo Desai APRN-*  PCP: JJ Simms-CNP   Last visit with PCP: 4/3/2025   Next visit with PCP: not yet scheduled; recommended    Subjective   Medication Reconciliation:  Changed: ***  Added: ***  Discontinued: ***    Drug Interactions  No relevant drug interactions were noted.    Medication System Management  Patient's preferred pharmacy: [facility]  Adherence/Organization: ***  Affordability/Accessibility: ***      Interval History  ***    HPI  WEIGHT LOSS  BMI Readings from Last 3 Encounters:   04/03/25 51.49 kg/m²   11/07/24 49.07 kg/m²   10/10/24 50.52 kg/m²      Starting weight: 319 lbs. (***)  Current weight: *** lbs.    Lifestyle  Diet: *** meals/day. ***  BK: ***  LN: ***  DN: ***  Snacks: ***  Drinks: ***  Has worked with nutritionist/dietician? {Yes/No:46122}  Has worked with weight management? {Yes/No:39194}  Exercise: {exercise:08535}  Activity type: {Type of Exercise (Optional):62569}  Is limited by: {Exercise limitations:70789}    Other Potential Contributing Factors  Alcohol use: Average *** drinks/week  Tobacco use: {YES wildcard/NO:60}  Other drug use: {YES wildcard/NO:60}  Medications that may cause weight gain: none  Mental health: {Mental Health Status:14893}  Eating Disorders: {Eating Disorder Hx:63729}  Comorbidities: none    Pertinent PMH Review:  PMH of Pancreatitis: {YES,NO:48805}  PMH of Retinopathy: {YES,NO:93370}  PMH of MTC: {YES,NO:39622}  PMH of MEN2: {YES,NO:20855}    Non-Pharmacological Therapy  Weight loss techniques attempted:  {Weight loss methods:38180}    Pharmacological Therapy  Current Medications: phentermine 37.5 mg daily  Adverse Effects: ***  Previous Medications: ***     Insurance coverage of weight-loss medications? No, weight loss medications are a  "plan/benefit exclusion  Eligible for copay cards/programs? Yes, commercial insurance  Eligible for  PAP? N/A      Objective   Allergies[1]  Social History     Social History Narrative    Not on file      Medication Review  Current Outpatient Medications   Medication Instructions    phentermine (ADIPEX-P) 37.5 mg, oral, Daily before breakfast      Vitals  BP Readings from Last 2 Encounters:   04/03/25 128/88   11/07/24 134/90     BMI Readings from Last 1 Encounters:   04/03/25 51.49 kg/m²      Labs  A1C  Lab Results   Component Value Date    HGBA1C 5.9 (H) 04/12/2025    HGBA1C 5.2 03/11/2023    HGBA1C 5.0 12/19/2018     BMP  Lab Results   Component Value Date    CALCIUM 8.8 04/12/2025     04/12/2025    K 4.3 04/12/2025    CO2 28 04/12/2025     04/12/2025    BUN 10 04/12/2025    CREATININE 0.61 04/12/2025    EGFR 117 04/12/2025     LFTs  Lab Results   Component Value Date    ALT 18 04/12/2025    AST 20 04/12/2025    ALKPHOS 79 04/12/2025    BILITOT 0.5 04/12/2025     FLP  Lab Results   Component Value Date    TRIG 120 04/12/2025    CHOL 203 (H) 04/12/2025    LDLF 122 (H) 03/11/2023    LDLCALC 136 (H) 04/12/2025    HDL 43 (L) 04/12/2025     Urine Microalbumin  No results found for: \"MICROALBCREA\"  Weight Management  Wt Readings from Last 3 Encounters:   04/03/25 145 kg (319 lb)   11/07/24 138 kg (304 lb)   10/10/24 142 kg (313 lb)      There is no height or weight on file to calculate BMI.     Assessment/Plan   Problem List Items Addressed This Visit    None      Clinical Pharmacist follow-up: ***, Telehealth visit  Patient is not followed in CCM.     Continue all meds under the continuation of care with the referring provider and clinical pharmacy team.    Thank you,  Leesa eTresa, PharmD  Clinical Pharmacist  [phone]    Verbal consent to manage patient's drug therapy was obtained from the patient. They were informed they may decline to participate or withdraw from participation in pharmacy " services at any time.       [1] No Known Allergies

## 2025-04-22 ENCOUNTER — APPOINTMENT (OUTPATIENT)
Dept: PHARMACY | Facility: HOSPITAL | Age: 40
End: 2025-04-22
Payer: COMMERCIAL

## 2025-05-22 DIAGNOSIS — E66.813 CLASS 3 SEVERE OBESITY DUE TO EXCESS CALORIES WITHOUT SERIOUS COMORBIDITY WITH BODY MASS INDEX (BMI) OF 50.0 TO 59.9 IN ADULT: Primary | ICD-10-CM

## 2025-06-02 DIAGNOSIS — E55.9 VITAMIN D DEFICIENCY: Primary | ICD-10-CM

## 2025-06-02 RX ORDER — ERGOCALCIFEROL 1.25 MG/1
1.25 CAPSULE ORAL WEEKLY
Qty: 12 CAPSULE | Refills: 0 | Status: SHIPPED | OUTPATIENT
Start: 2025-06-02 | End: 2025-08-25

## 2025-07-31 DIAGNOSIS — E55.9 VITAMIN D DEFICIENCY: ICD-10-CM

## 2025-07-31 RX ORDER — ERGOCALCIFEROL 1.25 MG/1
1.25 CAPSULE ORAL
Qty: 8 CAPSULE | Refills: 1 | Status: SHIPPED | OUTPATIENT
Start: 2025-07-31